# Patient Record
Sex: FEMALE | Race: WHITE | URBAN - METROPOLITAN AREA
[De-identification: names, ages, dates, MRNs, and addresses within clinical notes are randomized per-mention and may not be internally consistent; named-entity substitution may affect disease eponyms.]

---

## 2018-01-02 ENCOUNTER — FOLLOW UP ESTABLISHED (OUTPATIENT)
Dept: URBAN - METROPOLITAN AREA CLINIC 24 | Facility: CLINIC | Age: 73
End: 2018-01-02
Payer: MEDICARE

## 2018-01-02 DIAGNOSIS — H25.813 COMBINED FORMS OF AGE-RELATED CATARACT, BILATERAL: Primary | ICD-10-CM

## 2018-01-02 DIAGNOSIS — C44.119 BASAL CELL CARCINOMA SKIN/ LEFT EYELID, INCLUDING CANTHUS: ICD-10-CM

## 2018-01-02 PROCEDURE — 92014 COMPRE OPH EXAM EST PT 1/>: CPT | Performed by: OPTOMETRIST

## 2018-01-02 PROCEDURE — 92015 DETERMINE REFRACTIVE STATE: CPT | Performed by: OPTOMETRIST

## 2018-01-02 ASSESSMENT — INTRAOCULAR PRESSURE
OS: 16
OD: 16

## 2018-01-02 ASSESSMENT — KERATOMETRY
OS: 44.27
OD: 44.18

## 2018-01-02 ASSESSMENT — VISUAL ACUITY
OS: 20/20
OD: 20/20

## 2019-02-19 ENCOUNTER — FOLLOW UP ESTABLISHED (OUTPATIENT)
Dept: URBAN - METROPOLITAN AREA CLINIC 24 | Facility: CLINIC | Age: 74
End: 2019-02-19
Payer: MEDICARE

## 2019-02-19 PROCEDURE — 92014 COMPRE OPH EXAM EST PT 1/>: CPT | Performed by: OPTOMETRIST

## 2019-02-19 ASSESSMENT — KERATOMETRY
OS: 44.32
OD: 44.21

## 2019-02-19 ASSESSMENT — INTRAOCULAR PRESSURE
OS: 15
OD: 15

## 2019-02-19 ASSESSMENT — VISUAL ACUITY
OS: 20/20
OD: 20/20

## 2022-01-18 ENCOUNTER — OFFICE VISIT (OUTPATIENT)
Dept: URBAN - METROPOLITAN AREA CLINIC 26 | Facility: CLINIC | Age: 77
End: 2022-01-18
Payer: MEDICARE

## 2022-01-18 DIAGNOSIS — H52.4 PRESBYOPIA: ICD-10-CM

## 2022-01-18 DIAGNOSIS — H43.813 VITREOUS DEGENERATION, BILATERAL: ICD-10-CM

## 2022-01-18 PROCEDURE — 92014 COMPRE OPH EXAM EST PT 1/>: CPT | Performed by: OPTOMETRIST

## 2022-01-18 ASSESSMENT — VISUAL ACUITY
OD: 20/25
OS: 20/25

## 2022-01-18 ASSESSMENT — INTRAOCULAR PRESSURE
OD: 19
OS: 19

## 2023-01-19 ENCOUNTER — OFFICE VISIT (OUTPATIENT)
Dept: URBAN - METROPOLITAN AREA CLINIC 26 | Facility: CLINIC | Age: 78
End: 2023-01-19
Payer: MEDICARE

## 2023-01-19 DIAGNOSIS — H25.813 COMBINED FORMS OF AGE-RELATED CATARACT, BILATERAL: ICD-10-CM

## 2023-01-19 DIAGNOSIS — H43.813 VITREOUS DEGENERATION, BILATERAL: ICD-10-CM

## 2023-01-19 DIAGNOSIS — H16.223 KERATOCONJUNCTIVITIS SICCA, BILATERAL: Primary | ICD-10-CM

## 2023-01-19 PROCEDURE — 92014 COMPRE OPH EXAM EST PT 1/>: CPT | Performed by: OPTOMETRIST

## 2023-01-19 PROCEDURE — 92134 CPTRZ OPH DX IMG PST SGM RTA: CPT | Performed by: OPTOMETRIST

## 2023-01-19 ASSESSMENT — VISUAL ACUITY
OD: 20/25
OS: 20/30

## 2023-01-19 ASSESSMENT — INTRAOCULAR PRESSURE
OD: 13
OS: 14

## 2023-01-19 ASSESSMENT — KERATOMETRY
OS: 44.38
OD: 44.25

## 2023-01-19 NOTE — IMPRESSION/PLAN
Impression: Keratoconjunctivitis sicca, bilateral: H14.335. Plan: Recommend artificial tears at least 4 times a day and gel drop or tear ointment at bedtime, Omega 3 fatty acids (2-3,000 mg) daily. Drink plenty water. Avoid overhead fans at bedtime.

## 2024-01-19 ENCOUNTER — OFFICE VISIT (OUTPATIENT)
Dept: URBAN - METROPOLITAN AREA CLINIC 26 | Facility: CLINIC | Age: 79
End: 2024-01-19
Payer: MEDICARE

## 2024-01-19 DIAGNOSIS — H16.223 KERATOCONJUNCTIVITIS SICCA, BILATERAL: ICD-10-CM

## 2024-01-19 DIAGNOSIS — H43.813 VITREOUS DEGENERATION, BILATERAL: ICD-10-CM

## 2024-01-19 DIAGNOSIS — H25.813 COMBINED FORMS OF AGE-RELATED CATARACT, BILATERAL: Primary | ICD-10-CM

## 2024-01-19 PROCEDURE — 92134 CPTRZ OPH DX IMG PST SGM RTA: CPT | Performed by: OPTOMETRIST

## 2024-01-19 PROCEDURE — 92014 COMPRE OPH EXAM EST PT 1/>: CPT | Performed by: OPTOMETRIST

## 2024-01-19 ASSESSMENT — KERATOMETRY
OD: 44.25
OS: 44.25

## 2024-01-19 ASSESSMENT — VISUAL ACUITY
OS: 20/20
OD: 20/40

## 2024-01-19 ASSESSMENT — INTRAOCULAR PRESSURE
OS: 16
OD: 16